# Patient Record
Sex: MALE | Employment: UNEMPLOYED | URBAN - METROPOLITAN AREA
[De-identification: names, ages, dates, MRNs, and addresses within clinical notes are randomized per-mention and may not be internally consistent; named-entity substitution may affect disease eponyms.]

---

## 2018-04-20 ENCOUNTER — HOSPITAL ENCOUNTER (EMERGENCY)
Facility: CLINIC | Age: 48
Discharge: HOME | End: 2018-04-20
Attending: EMERGENCY MEDICINE | Admitting: EMERGENCY MEDICINE
Payer: MEDICAID

## 2018-04-20 VITALS
BODY MASS INDEX: 24.88 KG/M2 | SYSTOLIC BLOOD PRESSURE: 127 MMHG | WEIGHT: 168 LBS | TEMPERATURE: 98 F | OXYGEN SATURATION: 97 % | HEIGHT: 69 IN | DIASTOLIC BLOOD PRESSURE: 98 MMHG | RESPIRATION RATE: 16 BRPM

## 2018-04-20 DIAGNOSIS — Z59.00 HOMELESS: ICD-10-CM

## 2018-04-20 DIAGNOSIS — F10.920 ALCOHOLIC INTOXICATION WITHOUT COMPLICATION (H): ICD-10-CM

## 2018-04-20 PROCEDURE — 99283 EMERGENCY DEPT VISIT LOW MDM: CPT

## 2018-04-20 SDOH — ECONOMIC STABILITY - HOUSING INSECURITY: HOMELESSNESS UNSPECIFIED: Z59.00

## 2018-04-20 NOTE — ED AVS SNAPSHOT
Emergency Department    [address] [facility] [address]    Phone:  [phone]    Fax:  [phone]                                       Tello Marroquin   MRN: [id number]    Department:   Emergency Department   Date of Visit:  4/20/2018           After Visit Summary Signature Page     I have received my discharge instructions, and my questions have been answered. I have discussed any challenges I see with this plan with the nurse or doctor.    ..........................................................................................................................................  Patient/Patient Representative Signature      ..........................................................................................................................................  Patient Representative Print Name and Relationship to Patient    ..................................................               ................................................  Date                                            Time    ..........................................................................................................................................  Reviewed by Signature/Title    ...................................................              ..............................................  Date                                                            Time

## 2018-04-20 NOTE — ED NOTES
Bed: ED18  Expected date: 4/19/18  Expected time: 12:05 AM  Means of arrival: Ambulance  Comments:  HEMS [id number] 47M intoxicated

## 2018-04-20 NOTE — ED PROVIDER NOTES
"  History     Chief Complaint:  Alcohol Intoxication     HPI   The history is limited secondary to alcohol intoxication.     Tello Marroquin is a 47-year-old male who presents via EMS for evaluation of alcohol intoxication.  The patient was released from detox this morning and [place] PD [place] quite [place].  Patient brought here for evaluation.  He has no complaints other than right elbow pain, which has been present for over 2 months but continues to linger.  Not worse in any way.      Allergies:  No known drug allergies.     Medications:    No daily medications.     Past Medical History:    History reviewed. No pertinent medical history.     Past Surgical History:    Surgical history reviewed. No pertinent surgical history.    Family History:    History reviewed. No pertinent family history.     Social History:  Marital Status: Single   Presents to the ED via EMS   Alcohol Use: No     Review of Systems   Unable to perform ROS: Other (alcohol intoxication)     Physical Exam     Patient Vitals for the past 24 hrs:   BP Temp Temp src Heart Rate Resp SpO2 Height Weight   04/20/18 0028 (!) 127/98 98  F (36.7  C) Oral 112 16 97 % 1.753 m (5' 9\") 76.2 kg (168 lb)        Physical Exam   Constitutional: He is oriented to person, place, and time.   Intoxicated awake and alert   HENT:   Head: Normocephalic and atraumatic.   Right Ear: External ear normal.   Mouth/Throat: Oropharynx is clear and moist.   Eyes: Conjunctivae and EOM are normal. Pupils are equal, round, and reactive to light.   Neck: Normal range of motion. Neck supple. No JVD present.   Cardiovascular: Normal rate, regular rhythm and normal heart sounds.    Pulmonary/Chest: Effort normal and breath sounds normal.   Abdominal: Soft. Bowel sounds are normal. He exhibits no distension. There is no tenderness. There is no rebound.   Musculoskeletal: Normal range of motion.   Lymphadenopathy:     He has no cervical adenopathy. "   Neurological: He is alert and oriented to person, place, and time. He displays normal reflexes. No cranial nerve deficit. He exhibits normal muscle tone. Coordination normal.   Skin: Skin is warm and dry.   Psychiatric: He has a normal mood and affect. His behavior is normal. Judgment normal.   Nursing note and vitals reviewed.      Emergency Department Course   Nursing notes and vitals reviewed.    (0020) I entered the room with my scribe, obtained the history, and performed an exam of the patient as documented above.    (0430) Patient reevaluated.     Findings and plan explained to the patient. Patient discharged home with instructions regarding supportive care, medications, and reasons to return. The importance of close follow-up was reviewed.      Impression & Plan    Medical Decision Making:  Patient is dropped off by EMS after being found in the [place] intoxicated.  On arrival patient is mild to moderately intoxicated but denies other complaints.  Patient was recently discharged from detox.  Patient was observed in the ER without concerns for alternative behavior vital signs are stable.  Patient is requesting to sleep in the bed until the morning this was offered as a due to his history of homelessness and he was requested to be discharged in the morning and patient was discharged in improved condition.    Diagnosis:    ICD-10-CM    1. Alcoholic intoxication without complication (H) F10.920    2. Homeless Z59.0      Disposition:  Discharge        [facility] EMERGENCY DEPARTMENT       Scribe disclosure:  I, Torsten Meier, am serving as a scribe on 4/20/2018 at 12:53 AM to personally document services performed by Dr. Moyer based on my observations and the provider's statements to me.                  Timoteo Moyer MD  04/20/18 0607

## 2018-04-20 NOTE — DISCHARGE INSTRUCTIONS
Intoxicación Por Alcohol [Alcohol Intoxication]  La intoxicación por alcohol se da cuando usted juan alcohol más rápido de lo que el hígado puede trabajar para eliminarlo del sistema. La intoxicación por alcohol afecta elena juicio (pensamientos) y elena coordinación. Un nivel muy alto de alcohol en la tyler puede provocar un coma, hacer que la respiración se vuelva muy lenta, e incluso puede causarle la muerte.  Si gianni alcohol todos los días, eso puede ir, poco a poco, causándole daños permanentes en el hígado, el cerebro, el corazón, el páncreas y otros órganos. El consumo de alcohol lew el embarazo puede ocasionar daños permanentes al bebé que está creciendo.  Cuidados En [place]:    No kota más alcohol.    NO CONDUZCA hasta que hayan desaparecido los efectos del alcohol.    Descanse mucho en los próximos días. Kota abundantes líquidos y otras bebidas no alcohólicas. Trate de comer a intervalos regulares.    Si ha estado bebiendo mucho todos los días, es posible que sienta el sîndrome de abstinencia del alcohol (alcohol withdrawl). A esto también se lo conoce ping  la temblorina  (temblores breezy) o  delirium tremens  (DTs). Los síntomas usuales parson de 3 a 4 días y pueden incluir nerviosismo (nervousness), temblores (shakiness), náuseas (nausea), sudor (sweating) o insomnio (no poder dormir [sleeplessness]). Lo mejor lew jesus manuel período es que usted permanezca con elena wojciech o con amigos que puedan ayudarlo y contenerlo. También puede inscribirse en algún programa residencial de desintoxicación (residential detox program). Si kalpana síntomas son graves, comuníquese con elena médico para que le recete algún medicamento que pueda ayudarlo.  Visita De Control:  Si el alcohol le está causando problemas, alguna de las siguientes organizaciones, entre otras, pueden serle de ayuda:  Alcohólicos Anónimos (Alcoholics Anonymous) ofrece contención a través de grupos de autoayuda. No se cobran cuotas ni honorarios. Consulte  "las Páginas Telly y llame para conocer el horario y el lugar donde se realizan las reuniones. www.aa.org  Al-[place] ofrece apoyo para alcohólicos y familiares de alcohólicos. [phone] www.al-anon.org  National [place] On Alcoholism And Drug Dependence (Consejo Nacional sobre Alcoholismo y Drogadicción) [phone] www.ncadd.org  Existen también programas hospitalización o residenciales de desintoxicación alcohólica. Consulte las Páginas Telly, en la sección \"Drug Abuse & Treatment Centers\" (Drogadicción y Centros de Tratamiento).  Busque Prontamente Atención Médica  si algo de lo siguiente ocurre:    Temblores breezy o convulsiones (seizure).    Fiebre superior a los 100.4  F (38.0  C).    Confusión o alucinaciones (lisandro, oír, sentir cosas que no están ahí presentes).    Mayor dolor en la parte superior del abdomen.    Vómito persistente o presencia de tyler en el vómito.  Date Last Reviewed: 11/28/2013 2000-2017 [employer]. [address] [place] [address]. Todos los derechos reservados. Esta información no pretende sustituir la atención médica profesional. Sólo elena médico puede diagnosticar y tratar un problema de augustin.        "

## 2018-04-20 NOTE — ED AVS SNAPSHOT
Emergency Department    [address] [facility] [address]    Phone:  [phone]    Fax:  [phone]                                       Tello Marroquin   MRN: [id number]    Department:   Emergency Department   Date of Visit:  4/20/2018           Patient Information     Date Of Birth          1970        Your diagnoses for this visit were:     Alcoholic intoxication without complication (H)     Homeless        You were seen by Timoteo Moyer MD.      Follow-up Information     Follow up with [facility] In 1 week.    Specialties:  Podiatry, Internal Medicine, Family Medicine    Why:  As needed    Contact information:    [address] [facility] [address]  [facility] [address] [phone]        Discharge Instructions         Intoxicación Por Alcohol [Alcohol Intoxication]  La intoxicación por alcohol se da cuando usted juan alcohol más rápido de lo que el hígado puede trabajar para eliminarlo del sistema. La intoxicación por alcohol afecta elena juicio (pensamientos) y elena coordinación. Un nivel muy alto de alcohol en la tyler puede provocar un coma, hacer que la respiración se vuelva muy lenta, e incluso puede causarle la muerte.  Si gianni alcohol todos los días, eso puede ir, poco a poco, causándole daños permanentes en el hígado, el cerebro, el corazón, el páncreas y otros órganos. El consumo de alcohol lew el embarazo puede ocasionar daños permanentes al bebé que está creciendo.  Cuidados En [place]:    No kota más alcohol.    NO CONDUZCA hasta que hayan desaparecido los efectos del alcohol.    Descanse mucho en los próximos días. Kota abundantes líquidos y otras bebidas no alcohólicas. Trate de comer a intervalos regulares.    Si ha estado bebiendo mucho todos los días, es posible que sienta el sîndrome de abstinencia del alcohol (alcohol withdrawl). A esto también se lo conoce ping  la temblorina  (temblores breezy) o  delirium tremens  (DTs).  "Los síntomas usuales parson de 3 a 4 días y pueden incluir nerviosismo (nervousness), temblores (shakiness), náuseas (nausea), sudor (sweating) o insomnio (no poder dormir [sleeplessness]). Lo mejor lew jesus manuel período es que usted permanezca con elena wojciech o con amigos que puedan ayudarlo y contenerlo. También puede inscribirse en algún programa residencial de desintoxicación (residential detox program). Si kalpana síntomas son graves, comuníquese con elena médico para que le recete algún medicamento que pueda ayudarlo.  Visita De Control:  Si el alcohol le está causando problemas, alguna de las siguientes organizaciones, entre otras, pueden serle de ayuda:  Alcohólicos Anónimos (Alcoholics Anonymous) ofrece contención a través de grupos de autoayuda. No se cobran cuotas ni honorarios. Consulte las Páginas Telly y llame para conocer el horario y el lugar donde se realizan las reuniones. www.aa.org  Al-Anon ofrece apoyo para alcohólicos y familiares de alcohólicos. [phone] www.al-anon.org  National [place] On Alcoholism And Drug Dependence (Consejo Nacional sobre Alcoholismo y Drogadicción) [phone] www.ncadd.org  Existen también programas hospitalización o residenciales de desintoxicación alcohólica. Consulte las Páginas Telly, en la sección \"Drug Abuse & Treatment Centers\" (Drogadicción y Centros de Tratamiento).  Busque Prontamente Atención Médica  si algo de lo siguiente ocurre:    Temblores breezy o convulsiones (seizure).    Fiebre superior a los 100.4  F (38.0  C).    Confusión o alucinaciones (lisandro, oír, sentir cosas que no están ahí presentes).    Mayor dolor en la parte superior del abdomen.    Vómito persistente o presencia de tyler en el vómito.  Date Last Reviewed: 11/28/2013 2000-2017 [employer]. [address] [place] [address], [place] [address]. Todos los derechos reservados. Esta información no pretende sustituir la atención médica profesional. Sólo elena médico puede diagnosticar y " tratar un problema de augustin.          24 Hour Appointment Hotline       To make an appointment at any [facility], call [phone] ([phone]). If you don't have a family doctor or clinic, we will help you find one. [facility] are conveniently located to serve the needs of you and your family.             Review of your medicines      Notice     You have not been prescribed any medications.            Orders Needing Specimen Collection     None      Pending Results     No orders found from 4/18/2018 to 4/21/2018.            Pending Culture Results     No orders found from 4/18/2018 to 4/21/2018.            Pending Results Instructions     If you had any lab results that were not finalized at the time of your Discharge, you can call the ED Lab Result RN at [phone]. You will be contacted by this team for any positive Lab results or changes in treatment. The nurses are available 7 days a week from 10A to 6:30P.  You can leave a message 24 hours per day and they will return your call.        Test Results From Your Hospital Stay               Clinical Quality Measure: Blood Pressure Screening     Your blood pressure was checked while you were in the emergency department today. The last reading we obtained was  BP: (!) 127/98 . Please read the guidelines below about what these numbers mean and what you should do about them.  If your systolic blood pressure (the top number) is less than 120 and your diastolic blood pressure (the bottom number) is less than 80, then your blood pressure is normal. There is nothing more that you need to do about it.  If your systolic blood pressure (the top number) is 120-139 or your diastolic blood pressure (the bottom number) is 80-89, your blood pressure may be higher than it should be. You should have your blood pressure rechecked within a year by a primary care provider.  If your systolic blood pressure (the top number) is 140 or greater or your diastolic blood  "pressure (the bottom number) is 90 or greater, you may have high blood pressure. High blood pressure is treatable, but if left untreated over time it can put you at risk for heart attack, stroke, or kidney failure. You should have your blood pressure rechecked by a primary care provider within the next 4 weeks.  If your provider in the emergency department today gave you specific instructions to follow-up with your doctor or provider even sooner than that, you should follow that instruction and not wait for up to 4 weeks for your follow-up visit.        Thank you for choosing [facility]       Thank you for choosing [facility] for your care. Our goal is always to provide you with excellent care. Hearing back from our patients is one way we can continue to improve our services. Please take a few minutes to complete the written survey that you may receive in the mail after you visit with us. Thank you!        [employer] Information     [employer] lets you send messages to your doctor, view your test results, renew your prescriptions, schedule appointments and more. To sign up, go to [email or web address] . Click on \"Log in\" on the left side of the screen, which will take you to the Welcome page. Then click on \"Sign up Now\" on the right side of the page.     You will be asked to enter the access code listed below, as well as some personal information. Please follow the directions to create your username and password.     Your access code is: [email or web address]  Expires: 2018  6:27 AM     Your access code will  in 90 days. If you need help or a new code, please call your [facility] clinic or [phone].        Care EveryWhere ID     This is your Care EveryWhere ID. This could be used by other organizations to access your [facility] medical records  [id number]        Equal Access to Services     URIEL BHATT AH: Jose Valdez, grupo vasquez, charles kang" ah. So [facility] [phone].    ATENCIÓN: Si habla español, tiene a elena disposición servicios gratuitos de asistencia lingüística. Llame al [phone].    We comply with applicable federal civil rights laws and [place] laws. We do not discriminate on the basis of race, color, national origin, age, disability, sex, sexual orientation, or gender identity.            After Visit Summary       This is your record. Keep this with you and show to your community pharmacist(s) and doctor(s) at your next visit.

## 2018-07-22 ENCOUNTER — HOSPITAL ENCOUNTER (EMERGENCY)
Facility: CLINIC | Age: 48
Discharge: OTHER INSTITUTION | End: 2018-07-22
Attending: EMERGENCY MEDICINE | Admitting: EMERGENCY MEDICINE
Payer: COMMERCIAL

## 2018-07-22 VITALS
HEART RATE: 76 BPM | RESPIRATION RATE: 18 BRPM | WEIGHT: 178 LBS | SYSTOLIC BLOOD PRESSURE: 123 MMHG | HEIGHT: 70 IN | TEMPERATURE: 98.6 F | OXYGEN SATURATION: 97 % | BODY MASS INDEX: 25.48 KG/M2 | DIASTOLIC BLOOD PRESSURE: 84 MMHG

## 2018-07-22 DIAGNOSIS — F10.920 ALCOHOLIC INTOXICATION WITHOUT COMPLICATION (H): ICD-10-CM

## 2018-07-22 PROCEDURE — 99285 EMERGENCY DEPT VISIT HI MDM: CPT

## 2018-07-22 NOTE — DISCHARGE INSTRUCTIONS
"  Alcohol Intoxication  Alcohol intoxication occurs when you drink alcohol faster than your liver can remove it from your system. The following facts are important to remember:    It can take 10 minutes or more to start to feel the effects of a drink, so you can easily get more intoxicated than you intended.    One drink may be more than 1 serving of alcohol. Depending on the drink, it can be 2 to 4 servings.    It takes about an hour for your body to metabolize (clear) 1 serving. If you have more than 1 drink, it can take a couple of hours or more.    Many things affect how drinks will affect you, including whether you ve eaten, how fast you drink, your size, how much you normally drink (or not), medicines you take, chronic diseases you have, and gender.  Signs and symptoms of alcohol poisoning  The following are signs and symptoms of alcohol poisoning:  Mild impairment    Reduced inhibitions    Slurred speech    Drowsiness    Decreased fine motor skills  Moderate impairment    Erratic behavior, aggression, depression    Impaired judgment    Confusion    Concentration difficulties    Coordination problems  Severe impairment    Vomiting    Seizures    Unconsciousness    Cold, clammy    Slow or irregular breathing    Hypothermia (low body temperature)    Coma  Health effects  Alcohol abuse causes health problems. Sometimes this can happen after only drinking a  little.\" There is no set number of drinks or amount of alcohol that defines too much. The more you drink at one time, and the more frequently you drink determine both the short-term and long-term health effects. It affects all parts of your body and your health, including your:    Brain. Alcohol is a central nervous system depressant. It can damage parts of the brain that affect your balance, memory, thinking, and emotions. It can cause memory loss, blackouts, depression, agitation, sleep cycle changes, and seizures. These changes may or may not be " reversible.    Heart and vascular system. Alcohol affects multiple areas. It can damage heart muscle causing cardiomyopathy, which is a weakening and stretching of the heart muscle. This can lead to trouble breathing, an irregular heartbeat, atrial fibrillation, leg swelling, and heart failure. It makes the blood vessels stiffen causing hypertension (high blood pressure). All of these problems increase your risk of having heart attacks or strokes.    Liver. Alcohol causes fat to build up in the liver, affecting its normal function. This increases the risk for hepatitis, leading to abdominal pain, appetite loss, jaundice, bleeding problems, liver fibrosis, and cirrhosis. This in turn can affect your ability to fight off infections, and can cause diabetes. The liver changes prevent it from removing toxins in your blood that can cause encephalopathy. Signs of this are confusion, altered level of consciousness, personality changes, memory loss, seizures, coma, and death.    Pancreas. Alcohol can cause inflammation of the pancreas, or pancreatitis. This can cause pain in your abdomen, fever, and diabetes.    Immune system. Alcohol weakens your immune system in a number of ways. It suppresses your immune system making it harder to fight off infections and colds. You will also have a higher risk of certain infections like pneumonia and tuberculosis.    Cancer risk. Alcohol raises your risk of cancer of the mouth, esophagus, pharynx, larynx, liver, and breast.    Sexual function. Alcohol abuse can also lead to sexual problems.  Alcohol use during pregnancy may cause permanent damage to the growing baby.  Home care  The following guidelines will help you care for yourself at home:    Don't drink any more alcohol.    Don't drive until all effects of the alcohol have worn off.    Don't operate machinery that can cause injuries.    Get lots of rest over the next few days. Drink plenty of water and other non-alcoholic liquids.  Try to eat regular meals.    If you have been drinking heavily on a daily basis, you may go through alcohol withdrawal. The usual symptoms last 3 to 4 days and may include nervousness, shakiness, nausea, sweating, sleeplessness, and can even cause seizures and a serious withdrawal symptom called delirium tremens, or DTs. During this time, it is best that you stay with family or friends who can help and support you. You can also admit yourself to a residential detox program. If your symptoms are severe (seizures, severe shakiness, confusion), contact your doctor or call an ambulance for help (see below).   Follow-up care  If alcohol is a problem in your life, these are some organizations that can help you:    Alcoholics Anonymous offers support through a self-help fellowship. There are no dues or fees. See the Yellow Pages and call for time and place of meetings. Find AA online at www.aa.org.    [employer] offers support to families of alcohol users. Contact 670-070-8969, or online at www.al-anovida.org.    National [place] on Alcoholism and Drug Dependence can be reached at 458-294-5758, or online at www.ncadd.org.    There are also inpatient and residential alcohol detox programs. Check the Internet or phonebook Yellow Pages under  Drug Abuse and Treatment Centers.   Call 911  Call 911 if any of these occur:    Trouble breathing or slow irregular breathing    Chest pain    Sudden weakness on one side of your body or sudden trouble speaking    Heavy bleeding or vomiting blood    Very drowsy or trouble awakening    Fainting or loss of consciousness    Rapid heart rate    Seizure  When to seek medical advice  Call your healthcare provider right away if any of these occur:    Severe shakiness     Fever of 100.4 F (38 C) or higher, or as directed by your healthcare provider    Confusion or hallucinations (seeing, hearing, or feeling things that are not there)    Pain in your upper abdomen that gets worse    Repeated  vomiting  Date Last Reviewed: 6/1/2016 2000-2017 [employer]. [address] [place] [address], [place] [address]. All rights reserved. This information is not intended as a substitute for professional medical care. Always follow your healthcare professional's instructions.

## 2018-07-22 NOTE — ED NOTES
Bed: ED17  Expected date:   Expected time:   Means of arrival:   Comments:  414  48 M etoh/poss assaulted  8100

## 2018-07-22 NOTE — ED PROVIDER NOTES
"  History     Chief Complaint:  Alcohol Intoxication     History limited due to alcohol intoxication. History obtained from nursing.     BOOGIE Marroquin is a 48 year old male who presents to the emergency department today for evaluation of alcohol intoxication. Per nursing note, patient checked into the emergency department because he told people he was having a stroke and then left so wife called 911 who returned him to the emergency department. His wife is concerned about alcohol withdrawal as he was in the emergency department last week and started drinking again after discharge. The patient says he does not remember telling people he thought he was having a stroke and feels like everything is fine; he has no specific complaints.  He endorses daily drinking.    Allergies:  No Known Drug Allergies     Medications:    Neurontin   Revia      Past Medical History:    Depressive disorder  Substance abuse   Hypertension    Past Surgical History:    Surgical history reviewed. No pertinent surgical history.     Family History:    Brother: Diabetes  Father: Stroke    Social History:  Smoking Status: Never Smoker  Smokeless Tobacco: Never Used  Alcohol Use: Positive   Daily  Marital Status: Single      Review of Systems   Reason unable to perform ROS: Alcohol intoxication      Physical Exam     Patient Vitals for the past 24 hrs:   BP Temp Temp src Pulse Heart Rate Resp SpO2 Height Weight   07/22/18 1859 123/84 - - - 90 18 97 % - -   07/22/18 1537 122/80 - - - 84 - - - -   07/22/18 1524 (!) 154/131 98.6  F (37  C) Oral 76 76 12 96 % 1.778 m (5' 10\") 80.7 kg (178 lb)   07/22/18 1512 (!) 149/94 - - - 80 - 97 % 1.803 m (5' 11\") 63.5 kg (140 lb)     Physical Exam  General/Appearance: appears stated age, mildly disheveled, strong odor of ETOH, slightly slurred speech  Eyes: EOMI, no scleral injection, no icterus  ENT: MMM  Neck: supple, nl ROM, no stiffness  Cardiovascular: RRR, nl S1S2, no m/r/g, 2+ pulses in all " "4 extremities, cap refill <2sec  Respiratory: CTAB, good air movement throughout, no wheezes/rhonchi/rales, no increased WOB, no retractions  Back: no lesions  GI: abd soft, non-distended, nttp,  no HSM, no rebound, no guarding, nl BS  MSK: REGALADO, good tone, no bony abnormality  Skin: warm and well-perfused, no rash, no edema, no ecchymosis, nl turgor  Neuro: GCS 14 (V4), CN 2-12 intact, strength 5/5 all 4 ext, nl sensation all 4 ext  Heme: no petechia, no purpura, no active bleeding        Emergency Department Course     Emergency Department Course:    1539 Nursing notes and vitals reviewed.    1557 I performed an exam of the patient as documented above.     1907 I personally answered all related questions prior to discharge.    Impression & Plan      Medical Decision Making:  Tello Marroquin is a 48 year old male who presents to the emergency department today for evaluation of intoxication. The pt \"told people he was afraid that he was having a stroke\" however he doesn't remember why he was saying this. Neurologically he's intact without any focal neuro deficits except for slurred speech consistent with his alcohol intoxication. I don't think that imaging is indicated. We'll send to detox.    Diagnosis:    ICD-10-CM    1. Alcoholic intoxication without complication (H) F10.920      Disposition:   The patient is discharged to home.    Discharge Medications:  No discharge medications.     Scribe Disclosure:  I, Stacey Alvarez, am serving as a scribe at 4:22 PM on 7/22/2018 to document services personally performed by Winter Monte based on my observations and the provider's statements to me.       EMERGENCY DEPARTMENT       Winter Monte MD  07/22/18 2110    "

## 2018-08-02 ENCOUNTER — HOSPITAL ENCOUNTER (EMERGENCY)
Facility: CLINIC | Age: 48
Discharge: HOME | End: 2018-08-03
Attending: EMERGENCY MEDICINE | Admitting: EMERGENCY MEDICINE
Payer: COMMERCIAL

## 2018-08-02 DIAGNOSIS — F10.921 ALCOHOL INTOXICATION, WITH DELIRIUM (H): ICD-10-CM

## 2018-08-02 DIAGNOSIS — F10.220 ACUTE ALCOHOLIC INTOXICATION IN ALCOHOLISM WITHOUT COMPLICATION (H): ICD-10-CM

## 2018-08-02 LAB
ALCOHOL BREATH TEST: 0.3 (ref 0–0.01)
ALCOHOL BREATH TEST: 0.37 (ref 0–0.01)

## 2018-08-02 PROCEDURE — 82075 ASSAY OF BREATH ETHANOL: CPT | Performed by: EMERGENCY MEDICINE

## 2018-08-02 PROCEDURE — 99285 EMERGENCY DEPT VISIT HI MDM: CPT | Performed by: EMERGENCY MEDICINE

## 2018-08-02 PROCEDURE — 99283 EMERGENCY DEPT VISIT LOW MDM: CPT | Mod: Z6 | Performed by: EMERGENCY MEDICINE

## 2018-08-02 PROCEDURE — 82075 ASSAY OF BREATH ETHANOL: CPT | Mod: 91 | Performed by: EMERGENCY MEDICINE

## 2018-08-02 ASSESSMENT — ENCOUNTER SYMPTOMS
ABDOMINAL PAIN: 0
WOUND: 0

## 2018-08-02 NOTE — ED PROVIDER NOTES
History     Chief Complaint   Patient presents with     Alcohol Intoxication     passed out on [employer], unable to care for self has trouble following questions.     BOOGIE Marroquin is a 48 year old male found passed out intoxicated in the next the fall and brought here to the Emergency Department by EMS for his safety.  The patient denies any complaints.  He is intoxicated.  I do not see any obvious trauma.  He will need to be sobered up and either be discharged or sent to [facility] detox.    I have reviewed the Medications, Allergies, Past Medical and Surgical History, and Social History in the [employer] system.    Past Medical History:   Diagnosis Date     Depressive disorder      Substance abuse        No past surgical history on file.    No family history on file.    Social History   Substance Use Topics     Smoking status: Never Smoker     Smokeless tobacco: Never Used     Alcohol use Yes      Comment: daily, hx etoh        No current facility-administered medications for this encounter.      No current outpatient prescriptions on file.      No Known Allergies     Review of Systems   Cardiovascular: Negative for chest pain.   Gastrointestinal: Negative for abdominal pain.   Skin: Negative for wound.   All other systems reviewed and are negative.    Physical Exam   BP: 114/78  Pulse: 80  Resp: 14  SpO2: 98 %    Physical Exam   Constitutional:   Intoxicated   HENT:   Head: Atraumatic.   Eyes: Pupils are equal, round, and reactive to light.   Neck: Neck supple.   Cardiovascular: Normal rate, regular rhythm and normal heart sounds.    Pulmonary/Chest: Breath sounds normal.   Neurological: GCS eye subscore is 3. GCS verbal subscore is 4. GCS motor subscore is 5.   Psychiatric: His speech is slurred.   Nursing note and vitals reviewed.      ED Course   5:20 PM  The patient was seen and examined by Dr. Potter in Room 11.    ED Course     Procedures               Labs Ordered and Resulted from Time of  ED Arrival Up to the Time of Departure from the ED   ALCOHOL BREATH TEST POCT - Abnormal; Notable for the following:        Result Value    Alcohol Breath Test 0.373 (*)     All other components within normal limits   ALCOHOL BREATH TEST POCT - Abnormal; Notable for the following:     Alcohol Breath Test 0.298 (*)     All other components within normal limits            Assessments & Plan (with Medical Decision Making)   Chronic alcoholic with acute alcohol intoxication.  Will keep him safe in the emergency department overnight and discharge when sober.  He is here because of being found in public intoxicated not here seeking detox or treatment.  I do not see any evidence for trauma.    I have reviewed the nursing notes.    I have reviewed the findings, diagnosis, plan and need for follow up with the patient.    New Prescriptions    No medications on file       Final diagnoses:   Acute alcoholic intoxication in alcoholism without complication (H)   I, Kip Butler, am serving as a trained medical scribe to document services personally performed by Prashanth Potter MD, based on the provider's statements to me.   IPrashanth MD, was physically present and have reviewed and verified the accuracy of this note documented by Kip Butler.     8/2/2018   [facility], [place], EMERGENCY DEPARTMENT     Hong Potter MD  08/03/18 0021

## 2018-08-02 NOTE — ED NOTES
Bed: ED11  Expected date: 8/2/18  Expected time: 4:36 PM  Means of arrival: Ambulance  Comments:  [id number] 48yr M ETOH

## 2018-08-02 NOTE — ED AVS SNAPSHOT
[facility], Emergency Department    [address]    [place] [address]    Phone:  [phone]    Fax:  [phone]                                       Tello Marroquin   MRN: [id number]    Department:  [facility], Emergency Department   Date of Visit:  8/2/2018           Patient Information     Date Of Birth          1970        Your diagnoses for this visit were:     Acute alcoholic intoxication in alcoholism without complication (H)     Alcohol intoxication, with delirium (H)        You were seen by Hong Potter MD.        Discharge Instructions       I strongly recommend that you get into detox and then treatment    Please make an appointment to follow up with Your Primary Care Provider in a few days for alcohol cessation counseling.     Return to the ED if you are having any other urgent/life-threatening concerns.       24 Hour Appointment Hotline       To make an appointment at any [facility] clinic, call [phone] ([phone]). If you don't have a family doctor or clinic, we will help you find one. [facility] clinics are conveniently located to serve the needs of you and your family.             Review of your medicines      Notice     You have not been prescribed any medications.            Procedures and tests performed during your visit     Procedure/Test Number of Times Performed    Alcohol breath test POCT 2      Orders Needing Specimen Collection     None      Pending Results     No orders found for last 3 day(s).            Pending Culture Results     No orders found for last 3 day(s).            Pending Results Instructions     If you had any lab results that were not finalized at the time of your Discharge, you can call the ED Lab Result RN at [phone]. You will be contacted by this team for any positive Lab results or changes in treatment. The nurses are available 7 days a week from 10A to 6:30P.  You can leave a message 24 hours per day and they  "will return your call.        Thank you for choosing [facility]       Thank you for choosing [facility] for your care. Our goal is always to provide you with excellent care. Hearing back from our patients is one way we can continue to improve our services. Please take a few minutes to complete the written survey that you may receive in the mail after you visit with us. Thank you!        [employer] Information     [employer] lets you send messages to your doctor, view your test results, renew your prescriptions, schedule appointments and more. To sign up, go to [email or web address] . Click on \"Log in\" on the left side of the screen, which will take you to the Welcome page. Then click on \"Sign up Now\" on the right side of the page.     You will be asked to enter the access code listed below, as well as some personal information. Please follow the directions to create your username and password.     Your access code is: [email or web address]  Expires: 10/21/2018  4:59 PM     Your access code will  in 90 days. If you need help or a new code, please call your [facility] clinic or [phone].        Care EveryWhere ID     This is your Care EveryWhere ID. This could be used by other organizations to access your [facility] medical records  [id number]        Equal Access to Services     URIEL BHATT : Jose keyo Courtney, waaxda luqadaha, qaybta kaalmada adeegyada, charles moe. So [facility] [phone].    ATENCIÓN: Si habla español, tiene a elena disposición servicios gratuitos de asistencia lingüística. Llame al [phone].    We comply with applicable federal civil rights laws and [place] laws. We do not discriminate on the basis of race, color, national origin, age, disability, sex, sexual orientation, or gender identity.            After Visit Summary       This is your record. Keep this with you and show to your community pharmacist(s) and doctor(s) at your next visit.                  "

## 2018-08-02 NOTE — ED AVS SNAPSHOT
[facility], [place], Emergency Department    [address]    [place] [address]    Phone:  [phone]    Fax:  [phone]                                       Tello Marroquin   MRN: [id number]    Department:  [facility], Emergency Department   Date of Visit:  8/2/2018           After Visit Summary Signature Page     I have received my discharge instructions, and my questions have been answered. I have discussed any challenges I see with this plan with the nurse or doctor.    ..........................................................................................................................................  Patient/Patient Representative Signature      ..........................................................................................................................................  Patient Representative Print Name and Relationship to Patient    ..................................................               ................................................  Date                                            Time    ..........................................................................................................................................  Reviewed by Signature/Title    ...................................................              ..............................................  Date                                                            Time

## 2018-08-03 VITALS
RESPIRATION RATE: 18 BRPM | SYSTOLIC BLOOD PRESSURE: 107 MMHG | TEMPERATURE: 96.8 F | OXYGEN SATURATION: 97 % | HEART RATE: 78 BPM | DIASTOLIC BLOOD PRESSURE: 68 MMHG

## 2018-08-03 PROCEDURE — 25000132 ZZH RX MED GY IP 250 OP 250 PS 637: Performed by: EMERGENCY MEDICINE

## 2018-08-03 RX ORDER — ACETAMINOPHEN 325 MG/1
650 TABLET ORAL ONCE
Status: COMPLETED | OUTPATIENT
Start: 2018-08-03 | End: 2018-08-03

## 2018-08-03 RX ADMIN — ACETAMINOPHEN 650 MG: 325 TABLET, FILM COATED ORAL at 06:25

## 2018-08-03 NOTE — ED PROVIDER NOTES
Emergency Department Patient Sign-out       Brief HPI and ED course:  This is a 48 year old male signed out to me by Dr. Potter .  See initial ED Provider note for details of the presentation. In brief, patient brought in by EMS after being noted to be found down and smelling of EtOH in public.     Vitals:   Patient Vitals for the past 24 hrs:   BP Pulse Resp SpO2   08/02/18 2125 111/73 88 14 98 %   08/02/18 1716 114/78 80 - -       Received Sign-out Plan:    Pending studies include none    Plan:  Monitor for mental status clearing from EtOH, further work-up if not clearing appropriately.      Events after assuming care:  After care was assumed, a focused history and physical was performed. Agree with findings relayed by previous provider.     Patient arriving with altered mental status with reason to suspect alcohol or other drug intoxication as etiology. Exam without findings suggestive of trauma, non-focal. Breath EtOH 0.298.     With monitoring, patient's mental status cleared. Patient then clinically sober with steady gait and tolerating PO. After counseling on the diagnosis, work-up, and treatment plan, the patient was discharged. Recommended safe cessation of EtOH/drugs. Patient to follow-up with PCP in the coming days for cessation counseling. Patient to return to the ED if any urgent/life-threatening concerns.     Clinical impression:  Altered mental status  Alcohol intoxication delirium      Epi De Oliveira MD  Emergency Medicine           Epi De Oliveira MD  08/04/18 4503

## 2018-08-03 NOTE — DISCHARGE INSTRUCTIONS
I strongly recommend that you get into detox and then treatment    Please make an appointment to follow up with Your Primary Care Provider in a few days for alcohol cessation counseling.     Return to the ED if you are having any other urgent/life-threatening concerns.